# Patient Record
Sex: MALE | Race: ASIAN | Employment: UNEMPLOYED | URBAN - METROPOLITAN AREA
[De-identification: names, ages, dates, MRNs, and addresses within clinical notes are randomized per-mention and may not be internally consistent; named-entity substitution may affect disease eponyms.]

---

## 2023-05-23 ENCOUNTER — OFFICE VISIT (OUTPATIENT)
Dept: ORTHOPEDIC SURGERY | Age: 33
End: 2023-05-23

## 2023-05-23 VITALS — WEIGHT: 139.1 LBS

## 2023-05-23 DIAGNOSIS — Q76.49 HEMIVERTEBRA: ICD-10-CM

## 2023-05-23 DIAGNOSIS — M41.56 OTHER SECONDARY SCOLIOSIS, LUMBAR REGION: ICD-10-CM

## 2023-05-23 DIAGNOSIS — M54.50 LOW BACK PAIN, UNSPECIFIED BACK PAIN LATERALITY, UNSPECIFIED CHRONICITY, UNSPECIFIED WHETHER SCIATICA PRESENT: Primary | ICD-10-CM

## 2023-05-23 DIAGNOSIS — M54.10 RADICULAR PAIN OF RIGHT LOWER EXTREMITY: ICD-10-CM

## 2023-05-23 DIAGNOSIS — M51.36 DDD (DEGENERATIVE DISC DISEASE), LUMBAR: ICD-10-CM

## 2023-05-23 DIAGNOSIS — M47.816 LUMBAR SPONDYLOSIS: ICD-10-CM

## 2023-05-23 PROCEDURE — 99204 OFFICE O/P NEW MOD 45 MIN: CPT | Performed by: INTERNAL MEDICINE

## 2023-05-23 RX ORDER — CELECOXIB 200 MG/1
200 CAPSULE ORAL DAILY PRN
Qty: 30 CAPSULE | Refills: 1 | Status: SHIPPED | OUTPATIENT
Start: 2023-05-23

## 2023-05-23 RX ORDER — KETOROLAC TROMETHAMINE 10 MG/1
10 TABLET, FILM COATED ORAL 3 TIMES DAILY
Qty: 15 TABLET | Refills: 0 | Status: SHIPPED | OUTPATIENT
Start: 2023-05-23

## 2023-05-23 RX ORDER — GABAPENTIN 300 MG/1
300 CAPSULE ORAL NIGHTLY
Qty: 30 CAPSULE | Refills: 0 | Status: SHIPPED | OUTPATIENT
Start: 2023-05-23 | End: 2023-06-22

## 2023-05-23 NOTE — PROGRESS NOTES
Chief Complaint:   Chief Complaint   Patient presents with    Lower Back Pain     NP Lumbar. History of Present Illness:       Patient is a 33 y.o. male presents with the above complaint. The symptoms began  in 2015  and started without an injury. The patient describes a aching, burning pain that does radiate. The symptoms are near constant  and are are worsening since the onset. It was in 2015 that he was first diagnosed with a curvature of the spine. The symptoms of back pain do not show a typical discogenic provacative pattern and are worsened by standing and improved with bending backwards and sitting. There is not new onset weakness or progressive weakness of the lower extremities that has developed. The patient denies new onset bowel or bladder dysfunction. There  is no history of previous spinal trauma. The patient does not have history or orthopaedic lumbar spine surgery. Pain localizes to the right lumbar region    Painl levels: 8    There is lower limb pain. The back pain : limb pain is 40 : 60 and follows a L5-S1 dermatomal distribution involving the Right lower extremity radiating to the foot and ankle. Lower limb symptoms are of new onset over the past 2 weeks. Work-up to date has included: MRI and CT performed in [facility]  Prior treatment has included physical therapy, trial of Elavil and Lyrica. This patient reports no improvement with this treatment. The patient has no history or autoimmune disease, inflammatory arthropathy or crystal arthropathy. .       Past Medical History:      No past medical history on file. No past surgical history on file.       Present Medications:         Current Outpatient Medications   Medication Sig Dispense Refill    Empagliflozin-linaGLIPtin 10-5 MG TABS Take by mouth      ketorolac (TORADOL) 10 MG tablet Take 1 tablet by mouth three times daily Discontinue all other NSAIDs during the course of treatment and resumed thereafter 15

## 2023-12-05 ENCOUNTER — OFFICE VISIT (OUTPATIENT)
Dept: ORTHOPEDIC SURGERY | Age: 33
End: 2023-12-05

## 2023-12-05 DIAGNOSIS — Q76.49 CONGENITAL HEMIVERTEBRA: ICD-10-CM

## 2023-12-05 DIAGNOSIS — M51.36 DDD (DEGENERATIVE DISC DISEASE), LUMBAR: ICD-10-CM

## 2023-12-05 DIAGNOSIS — Z86.39 HISTORY OF DIABETES MELLITUS: Primary | ICD-10-CM

## 2023-12-05 DIAGNOSIS — M54.10 RADICULAR PAIN OF LEFT LOWER EXTREMITY: ICD-10-CM

## 2023-12-05 DIAGNOSIS — M41.56 OTHER SECONDARY SCOLIOSIS, LUMBAR REGION: ICD-10-CM

## 2023-12-05 DIAGNOSIS — M47.816 LUMBAR SPONDYLOSIS: ICD-10-CM

## 2023-12-05 RX ORDER — CELECOXIB 200 MG/1
200 CAPSULE ORAL DAILY PRN
Qty: 30 CAPSULE | Refills: 1 | Status: SHIPPED | OUTPATIENT
Start: 2023-12-05

## 2023-12-05 RX ORDER — GABAPENTIN 300 MG/1
300 CAPSULE ORAL 2 TIMES DAILY
Qty: 60 CAPSULE | Refills: 1 | Status: SHIPPED | OUTPATIENT
Start: 2023-12-05 | End: 2024-02-03

## 2023-12-05 NOTE — PROGRESS NOTES
Chief Complaint:   Chief Complaint   Patient presents with    Lower Back Pain     Low back pain- ongoing pain in low back. Has been experiencing new \"shocking\" feeling in both hands. History of Present Illness:       Patient is a 29 y.o. male presents with the above complaint. The patient was last seen approximately 6 months ago and lost to follow-up. . The patient describes a burning pain that does radiate. The symptoms are constant  and are are worsening since the onset. The patient describes a aching, burning pain that does radiate. The symptoms are near constant  and are are worsening since the onset. He noted no benefit from the trial of low-dose gabapentin 300 mg nightly but without side effects. He discontinued Celebrex prematurely at the recommendation of his PCP. He was first diagnosed with a curvature of the spine in 2015. The symptoms of back pain do not show a typical discogenic provacative pattern and are worsened by standing and improved with bending backwards and sitting. There is not new onset weakness or progressive weakness of the lower extremities that has developed. The patient denies new onset bowel or bladder dysfunction. There  is no history of previous spinal trauma. The patient does not have history or orthopaedic lumbar spine surgery. Pain localizes to the right lumbar region     Painl levels: 7     There is lower limb pain. The back pain : limb pain is 40 : 60 and follows a L5-S1 dermatomal distribution involving the Right lower extremity radiating to the foot and ankle. Work-up to date has included: MRI and CT performed in [facility]  Prior treatment has included physical therapy, trial of Elavil and Lyrica. This patient reports no improvement with this treatment. The patient has no history or autoimmune disease, inflammatory arthropathy or crystal arthropathy. Past Medical History:      No past medical history on file.     No past

## 2024-01-24 ENCOUNTER — OFFICE VISIT (OUTPATIENT)
Dept: ENDOCRINOLOGY | Age: 34
End: 2024-01-24

## 2024-01-24 VITALS
HEART RATE: 87 BPM | HEIGHT: 64 IN | DIASTOLIC BLOOD PRESSURE: 86 MMHG | SYSTOLIC BLOOD PRESSURE: 137 MMHG | WEIGHT: 147 LBS | BODY MASS INDEX: 25.1 KG/M2

## 2024-01-24 DIAGNOSIS — Z79.4 CONTROLLED TYPE 2 DIABETES MELLITUS WITHOUT COMPLICATION, WITH LONG-TERM CURRENT USE OF INSULIN (HCC): Primary | ICD-10-CM

## 2024-01-24 DIAGNOSIS — M41.117 JUVENILE IDIOPATHIC SCOLIOSIS OF LUMBOSACRAL REGION: ICD-10-CM

## 2024-01-24 DIAGNOSIS — E11.9 CONTROLLED TYPE 2 DIABETES MELLITUS WITHOUT COMPLICATION, WITH LONG-TERM CURRENT USE OF INSULIN (HCC): Primary | ICD-10-CM

## 2024-01-24 LAB — HBA1C MFR BLD: 6.5 %

## 2024-01-24 PROCEDURE — 99203 OFFICE O/P NEW LOW 30 MIN: CPT | Performed by: INTERNAL MEDICINE

## 2024-01-24 PROCEDURE — 83036 HEMOGLOBIN GLYCOSYLATED A1C: CPT | Performed by: INTERNAL MEDICINE

## 2024-01-24 NOTE — PROGRESS NOTES
Agustina Enciso is a 33 y.o. male is referred to me by Dr Colon  for evaluation and management of Type 2  diabetes. Agustina Enciso was diagnosed with Diabetes mellitus in 2017 when he was still in [facility].   At the time of diagnosis patient had polyuria polydipsia and Aic of 11 but once he started taking his metformin and probably watching his diet his A1c dropped down in the 6.5 range according to the wife Agustina Enciso got diabetic education in the past.  Comorbid conditions: Neuropathy  Patient was initially started on metformin which was stopped when he was switched to the combination of linagliptin empagliflozin which she has been taking since 2022.    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 No pertinent surgical history.  Social History     Socioeconomic History    Marital status:      Spouse name: Not on file    Number of children: Not on file    Years of education: Not on file    Highest education level: Not on file   Occupational History    Not on file   Tobacco Use    Smoking status: Never    Smokeless tobacco: Never   Substance and Sexual Activity    Alcohol use: Never    Drug use: Never    Sexual activity: Not on file   Other Topics Concern    Not on file   Social History Narrative    Not on file     Social

## 2024-07-25 ENCOUNTER — TELEPHONE (OUTPATIENT)
Dept: ENDOCRINOLOGY | Age: 34
End: 2024-07-25

## 2024-09-25 RX ORDER — GABAPENTIN 300 MG/1
300 CAPSULE ORAL 2 TIMES DAILY
Qty: 60 CAPSULE | Refills: 1 | OUTPATIENT
Start: 2024-09-25 | End: 2024-11-24

## 2025-04-16 ENCOUNTER — PATIENT MESSAGE (OUTPATIENT)
Dept: ENDOCRINOLOGY | Age: 35
End: 2025-04-16

## 2025-04-16 DIAGNOSIS — Z79.4 CONTROLLED TYPE 2 DIABETES MELLITUS WITHOUT COMPLICATION, WITH LONG-TERM CURRENT USE OF INSULIN: Primary | ICD-10-CM

## 2025-04-16 DIAGNOSIS — E11.9 CONTROLLED TYPE 2 DIABETES MELLITUS WITHOUT COMPLICATION, WITH LONG-TERM CURRENT USE OF INSULIN: Primary | ICD-10-CM

## 2025-06-12 ENCOUNTER — PATIENT MESSAGE (OUTPATIENT)
Dept: ENDOCRINOLOGY | Age: 35
End: 2025-06-12